# Patient Record
Sex: FEMALE | Race: WHITE | NOT HISPANIC OR LATINO
[De-identification: names, ages, dates, MRNs, and addresses within clinical notes are randomized per-mention and may not be internally consistent; named-entity substitution may affect disease eponyms.]

---

## 2017-01-10 PROBLEM — Z00.00 ENCOUNTER FOR PREVENTIVE HEALTH EXAMINATION: Status: ACTIVE | Noted: 2017-01-10

## 2023-02-02 ENCOUNTER — APPOINTMENT (OUTPATIENT)
Dept: ORTHOPEDIC SURGERY | Facility: CLINIC | Age: 73
End: 2023-02-02
Payer: COMMERCIAL

## 2023-02-02 VITALS — HEIGHT: 67 IN | WEIGHT: 160 LBS | BODY MASS INDEX: 25.11 KG/M2

## 2023-02-02 DIAGNOSIS — Z85.828 PERSONAL HISTORY OF OTHER MALIGNANT NEOPLASM OF SKIN: ICD-10-CM

## 2023-02-02 DIAGNOSIS — Z78.9 OTHER SPECIFIED HEALTH STATUS: ICD-10-CM

## 2023-02-02 DIAGNOSIS — M06.9 RHEUMATOID ARTHRITIS, UNSPECIFIED: ICD-10-CM

## 2023-02-02 DIAGNOSIS — Z85.3 PERSONAL HISTORY OF MALIGNANT NEOPLASM OF BREAST: ICD-10-CM

## 2023-02-02 DIAGNOSIS — Z80.9 FAMILY HISTORY OF MALIGNANT NEOPLASM, UNSPECIFIED: ICD-10-CM

## 2023-02-02 DIAGNOSIS — Z87.39 PERSONAL HISTORY OF OTHER DISEASES OF THE MUSCULOSKELETAL SYSTEM AND CONNECTIVE TISSUE: ICD-10-CM

## 2023-02-02 DIAGNOSIS — Z82.61 FAMILY HISTORY OF ARTHRITIS: ICD-10-CM

## 2023-02-02 PROCEDURE — 73030 X-RAY EXAM OF SHOULDER: CPT | Mod: RT

## 2023-02-02 PROCEDURE — 99203 OFFICE O/P NEW LOW 30 MIN: CPT | Mod: 25

## 2023-02-02 PROCEDURE — 20610 DRAIN/INJ JOINT/BURSA W/O US: CPT | Mod: RT

## 2023-02-02 PROCEDURE — 73110 X-RAY EXAM OF WRIST: CPT | Mod: RT

## 2023-02-02 RX ORDER — CELECOXIB 50 MG/1
CAPSULE ORAL
Refills: 0 | Status: ACTIVE | COMMUNITY

## 2023-02-02 RX ORDER — METHOTREXATE 2.5 MG/1
TABLET ORAL
Refills: 0 | Status: ACTIVE | COMMUNITY

## 2023-02-02 RX ORDER — ADALIMUMAB 40MG/0.4ML
40 KIT SUBCUTANEOUS
Refills: 0 | Status: ACTIVE | COMMUNITY

## 2023-02-02 NOTE — ASSESSMENT
[FreeTextEntry1] : 72-year-old woman with history of rheumatoid arthritis since 1996 with right shoulder and wrist pain.\par Wrist has severe arthritis seen on x-ray.  She is going to be seeing the hand specialist in 2 weeks and she should keep that appointment to discuss treatment options.  She can use a wrist splint if it provides some pain relief and support.  Warm compresses and ice as needed.\par In her shoulder she does not appear to have any significant arthritis or narrowing of the joint space.  She could have inflammation from the rheumatoid arthritis and/or some rotator cuff tendinopathy and bursitis.\par We did a steroid injection today in the subacromial space which hopefully will give her some relief.  She was given home exercises and a prescription for physical therapy.  She takes Celebrex and can take Tylenol as needed.\par Follow-up in about 6 weeks to check on her progress.  If she has ongoing pain I would get an MRI.\par

## 2023-02-02 NOTE — HISTORY OF PRESENT ILLNESS
[de-identified] : 71 y/o RHD woman with long history of rheumatoid arthritis was referred by Dr. Proctor comes in for evaluation for RIGHT shoulder and wrist pain.\par Her right shoulder started hurting in the summer without any specific injury or inciting event.  In the past she has had episodes of pain but they always got better but this time it persisted.  She has limited motion.  It hurts reaching different directions and sleeping.\par Her right wrist is also becoming more painful and she has difficulty lifting objects.\par Getting dressed can be painful.  She has pain when cooking and lifting objects both in the wrist and shoulder.\par Pain radiates down the arm and can be an 8 out of 10 and sharp.  It hurts driving as well. She has pain reaching across body\par Rest helps somewhat.\par She has not tried physical therapy, injections or other treatment.  She does not typically take anything for pain because she takes Celebrex every day anyhow for her rheumatoid arthritis in addition to taking Humira and methotrexate.  Sometimes she will take Tylenol.\par \par She was referred by Dr. Proctor. Santiago has has rheumatoid arthritis since 1996. She regularly takes Celebrex not specifically for shoulder/wrist.

## 2023-02-02 NOTE — REASON FOR VISIT
[Initial Visit] : an initial visit for [Shoulder Pain] : shoulder pain [FreeTextEntry2] : wrist pain

## 2023-02-02 NOTE — PROCEDURE
[Injection] : Injection [Right] : of the right [Subacromial Bursa] : subacromial bursa [Inflammation] : inflammation [Bleeding] : bleeding [Infection] : infection [Allergic Reaction] : allergic reaction [Patient] : patient [Risk] : risk [Benefits] : benefits [Alternatives] : alternatives [Verbal Consent Obtained] : verbal consent was obtained prior to the procedure [Alcohol] : alcohol [Posterior] : posterior [22] : a 22-gauge [1% Lidocaine___(mL)] : [unfilled] mL of 1% Lidocaine [Triamcinolone 40mg/mL___(mL)] : [unfilled] ~UmL of 40mg/mL triamcinolone [Bandage Applied] : a bandage [Tolerated Well] : The patient tolerated the procedure well [None] : none [No Strenuous Activity___day(s)] : avoid strenuous activity for [unfilled] day(s) [PRN] : PRN [FreeTextEntry1] : ChloraPrep

## 2023-02-02 NOTE — PHYSICAL EXAM
[UE] : Sensory: Intact in bilateral upper extremities [Normal RUE] : Right Upper Extremity: No scars, rashes, lesions, ulcers, skin intact [Normal LUE] : Left Upper Extremity: No scars, rashes, lesions, ulcers, skin intact [Normal Touch] : sensation intact for touch [Normal] : Oriented to person, place, and time, insight and judgement were intact and the affect was normal [de-identified] : RIGHT shoulder with left for comparison\par No edema, ecchymoses, erythema, obvious effusion.\par There may be a Damian muscle deformity on the right compared to the left.\par Tender anterior right shoulder and biceps tendon.\par Pain with active and passive right shoulder range of motion.\par Actively she can elevate her arm to 120 degrees and with help to about 170 degrees versus 180 degrees on the left.  Internal rotation to T11.\par With the arms at the side there is about 60 degrees external rotation.\par In 90 degrees abduction there is 70 degrees external rotation and -10 degrees internal rotation.\par Positive Neer and Vaughn.\par Motor is with 4+/5 supraspinatus with pain and 5 -/5 external rotation with pain and 5/5 internal rotation.  5 -/5 biceps with pain.\par Sensation is intact distally.\par \par Right wrist\par Tender radiocarpal joint.  Mild edema and warmth.\par Painful limited range of motion with crepitus\par Prominent ulnar styloid.\par No significant deformity of the fingers/hand otherwise.\par Intact flexion and extension of the digits.  Motor and sensation are intact.  Normal capillary refill [de-identified] : \par \par X-rays taken today of RIGHT shoulder AP, Y lateral and axillary views showed osteopenia.  Minimal cystic change in the humeral head that may be consistent with impingement.  No joint space narrowing.  No narrowing of the joint space\par \par X-rays of the right wrist 3 views today showed severe radiocarpal joint space narrowing as well as erosions of the radiocarpal and distal ulna.  Consistent with rheumatoid arthrosis with severe arthritis

## 2023-02-02 NOTE — CONSULT LETTER
[Dear  ___] : Dear  [unfilled], [Consult Letter:] : I had the pleasure of evaluating your patient, [unfilled]. [Please see my note below.] : Please see my note below. [Consult Closing:] : Thank you very much for allowing me to participate in the care of this patient.  If you have any questions, please do not hesitate to contact me. [Sincerely,] : Sincerely, [FreeTextEntry2] : David Proctor MD [FreeTextEntry3] : Caprice Baker MD

## 2023-02-15 ENCOUNTER — APPOINTMENT (OUTPATIENT)
Dept: ORTHOPEDIC SURGERY | Facility: CLINIC | Age: 73
End: 2023-02-15
Payer: COMMERCIAL

## 2023-02-15 VITALS — BODY MASS INDEX: 25.11 KG/M2 | WEIGHT: 160 LBS | RESPIRATION RATE: 16 BRPM | HEIGHT: 67 IN

## 2023-02-15 DIAGNOSIS — M25.532 PAIN IN LEFT WRIST: ICD-10-CM

## 2023-02-15 PROCEDURE — 99205 OFFICE O/P NEW HI 60 MIN: CPT

## 2023-02-15 PROCEDURE — 73130 X-RAY EXAM OF HAND: CPT | Mod: 50

## 2023-02-19 ENCOUNTER — TRANSCRIPTION ENCOUNTER (OUTPATIENT)
Age: 73
End: 2023-02-19

## 2023-03-16 ENCOUNTER — APPOINTMENT (OUTPATIENT)
Dept: ORTHOPEDIC SURGERY | Facility: CLINIC | Age: 73
End: 2023-03-16
Payer: COMMERCIAL

## 2023-03-16 DIAGNOSIS — G89.29 PAIN IN RIGHT SHOULDER: ICD-10-CM

## 2023-03-16 DIAGNOSIS — M67.911 UNSPECIFIED DISORDER OF SYNOVIUM AND TENDON, RIGHT SHOULDER: ICD-10-CM

## 2023-03-16 DIAGNOSIS — M25.511 PAIN IN RIGHT SHOULDER: ICD-10-CM

## 2023-03-16 PROCEDURE — 99213 OFFICE O/P EST LOW 20 MIN: CPT

## 2023-03-16 NOTE — PHYSICAL EXAM
[UE] : Sensory: Intact in bilateral upper extremities [Normal RUE] : Right Upper Extremity: No scars, rashes, lesions, ulcers, skin intact [Normal LUE] : Left Upper Extremity: No scars, rashes, lesions, ulcers, skin intact [Normal Touch] : sensation intact for touch [Normal] : Oriented to person, place, and time, insight and judgement were intact and the affect was normal [de-identified] : RIGHT shoulder with left for comparison\par No edema, ecchymoses, erythema, obvious effusion.\par Mildly tender anterior shoulder\par Pain with active and passive right shoulder range of motion.\par Actively she can elevate her arm to 165 degrees  versus 180 degrees on the left.  Internal rotation to T9.\par With the arms at the side there is about 60 degrees external rotation.\par In 90 degrees abduction there is 70 degrees external rotation and 10 degrees internal rotation.\par Discomfort with Neer and Vaughn.\par Motor is with 5=/5 supraspinatus with pain and 5 -/5 external rotation with pain and 5/5 internal rotation.  5 -/5 biceps with pain.\par Sensation is intact distally.\par \par Right wrist\par Tender radiocarpal joint.  Mild edema and warmth.\par Painful limited range of motion with crepitus\par Prominent ulnar styloid.\par No significant deformity of the fingers/hand otherwise.\par Intact flexion and extension of the digits.  Motor and sensation are intact.  Normal capillary refill [de-identified] : \par \par X-rays taken 2/2/23 of RIGHT shoulder AP, Y lateral and axillary views showed osteopenia.  Minimal cystic change in the humeral head that may be consistent with impingement.  No joint space narrowing.  No narrowing of the joint space\par \par X-rays of the right wrist 3 views 2/2/2023 showed severe radiocarpal joint space narrowing as well as erosions of the radiocarpal and distal ulna.  Consistent with rheumatoid arthrosis with severe arthritis

## 2023-03-16 NOTE — HISTORY OF PRESENT ILLNESS
[de-identified] : 71 y/o RHD woman with long history of rheumatoid arthritis was referred by Dr. Proctor comes in for evaluation for RIGHT shoulder pain.\par Her right shoulder started hurting in the summer without any specific injury or inciting event.  In the past she has had episodes of pain but they always got better but this time it persisted.  Today she is feeling a lot but better however discomfort is still present. We did an injection that has really helped. Pain is down to a 2/10. She continues to take Celebrex for her rheumatoid arthritis. She has not been taking Tylenol. She was referred for physical therapy but did not start yet, she is doing home exercises that we gave her. She is still being very cautious about her motion.  \par She saw Dr. Hawkins for her wrist pain.  He was also concerned about her left hand with tendon injury\par He spoke to her about a possible fusion.  She is going to see Dr. Ochoa regarding risk replacement as an alternative.  She is not sure she wants anything done.

## 2023-03-16 NOTE — ASSESSMENT
[FreeTextEntry1] : 72-year-old woman with history of rheumatoid arthritis since 1996 presented with right shoulder pain.  A steroid injection alleviated a large amount of the pain and her function is better with better motion and strength.\par She will continue with some home exercises.  If pain starts to get worse she may start some formal physical therapy as well for it.  She takes Celebrex and can take some Tylenol if needed for pain.\par She should be careful with lifting particularly away from her body or above the head.\par If pain gets worse again we may want to evaluate further with an MRI.  We would want to limit the number of steroid injections.\par Follow-up as needed

## 2024-02-28 ENCOUNTER — APPOINTMENT (OUTPATIENT)
Dept: ORTHOPEDIC SURGERY | Facility: CLINIC | Age: 74
End: 2024-02-28
Payer: COMMERCIAL

## 2024-02-28 VITALS — RESPIRATION RATE: 16 BRPM | WEIGHT: 160 LBS | HEIGHT: 67 IN | BODY MASS INDEX: 25.11 KG/M2

## 2024-02-28 DIAGNOSIS — M79.641 PAIN IN RIGHT HAND: ICD-10-CM

## 2024-02-28 PROCEDURE — 73130 X-RAY EXAM OF HAND: CPT | Mod: 50

## 2024-02-28 PROCEDURE — 99204 OFFICE O/P NEW MOD 45 MIN: CPT

## 2024-02-28 PROCEDURE — 99214 OFFICE O/P EST MOD 30 MIN: CPT

## 2024-02-28 NOTE — HISTORY OF PRESENT ILLNESS
[Right] : right hand dominant [FreeTextEntry1] : Patient returns with inability to straighten the right small finger which started yesterday, and a left dorsal hand flareup which started last month.  She denies any specific injury.  Patient has bilateral rheumatoid arthritis wrists with severe radiocarpal and midcarpal arthritis.  Patient was prescribed a Medrol Dosepak by her rheumatologist which helped with the hand flareup.  Patient takes Celebrex daily.

## 2024-02-28 NOTE — PHYSICAL EXAM
[de-identified] : On exam she has an extensor lag now of the right side at the MP joint.  There is distal ulnar swelling and tenderness.  EIP intact.  EDC to ring finger with good strength.  On the left side still has loss of ED Q but EDC appears intact to small finger.  There is now new ulnar deviation of the middle finger with extensor tendon subluxation.  40 degrees of wrist extension and flexion and nontender over radiocarpal or midcarpal joint but tender over distal radial ulnar joint [de-identified] : PA lateral oblique x-rays of both wrist demonstrate severe radiocarpal and midcarpal as well as distal radial ulnar joint arthritis bilaterally.  There is significant caput ulna on the right  Ultrasound demonstrates rupture of the EDC to and possible EDC to little finger

## 2024-02-28 NOTE — ASSESSMENT
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  I recommend that she undergo distal ulnar resection with ECU tenodesis and tendon transfer.  Risk benefits and alternatives discussed including, but not limited to additional tendon ruptures with significant delay to surgery.  We discussed about loss of full extension versus loss of full flexion.  She still will have arthritis in the wrist joint however her symptoms appear to be much more due to her distal radial ulnar joint and the surgery typically resolves this but her radiocarpal and midcarpal joint will be unaffected.  Discussed nerve injury tendon damage etc.  Once again we discussed prompt correction to minimize the time delay and additional tendon ruptures.  With additional tendon ruptures outcome is generally worse

## 2024-03-01 ENCOUNTER — NON-APPOINTMENT (OUTPATIENT)
Age: 74
End: 2024-03-01

## 2024-03-13 ENCOUNTER — TRANSCRIPTION ENCOUNTER (OUTPATIENT)
Age: 74
End: 2024-03-13

## 2024-03-13 RX ORDER — CHLORHEXIDINE GLUCONATE 213 G/1000ML
1 SOLUTION TOPICAL DAILY
Refills: 0 | Status: DISCONTINUED | OUTPATIENT
Start: 2024-03-14 | End: 2024-03-14

## 2024-03-13 RX ORDER — OXYCODONE AND ACETAMINOPHEN 5; 325 MG/1; MG/1
5-325 TABLET ORAL
Qty: 20 | Refills: 0 | Status: ACTIVE | COMMUNITY
Start: 2024-03-13 | End: 1900-01-01

## 2024-03-13 RX ORDER — ONDANSETRON 8 MG/1
8 TABLET, ORALLY DISINTEGRATING ORAL
Qty: 6 | Refills: 0 | Status: ACTIVE | COMMUNITY
Start: 2024-03-13 | End: 1900-01-01

## 2024-03-13 NOTE — ASU PATIENT PROFILE, ADULT - NSICDXPASTMEDICALHX_GEN_ALL_CORE_FT
PAST MEDICAL HISTORY:  Breast cancer     History of poliomyelitis     HLD (hyperlipidemia)     Rheumatoid arthritis

## 2024-03-13 NOTE — ASU PATIENT PROFILE, ADULT - NSICDXPASTSURGICALHX_GEN_ALL_CORE_FT
PAST SURGICAL HISTORY:  History of cholecystectomy     History of foot surgery Bilateral foot fusion    History of lumpectomy of left breast

## 2024-03-14 ENCOUNTER — OUTPATIENT (OUTPATIENT)
Dept: OUTPATIENT SERVICES | Facility: HOSPITAL | Age: 74
LOS: 1 days | Discharge: HOME | End: 2024-03-14
Payer: COMMERCIAL

## 2024-03-14 ENCOUNTER — TRANSCRIPTION ENCOUNTER (OUTPATIENT)
Age: 74
End: 2024-03-14

## 2024-03-14 ENCOUNTER — RESULT REVIEW (OUTPATIENT)
Age: 74
End: 2024-03-14

## 2024-03-14 ENCOUNTER — APPOINTMENT (OUTPATIENT)
Dept: ORTHOPEDIC SURGERY | Facility: AMBULATORY SURGERY CENTER | Age: 74
End: 2024-03-14
Payer: COMMERCIAL

## 2024-03-14 VITALS
HEIGHT: 67 IN | SYSTOLIC BLOOD PRESSURE: 158 MMHG | OXYGEN SATURATION: 97 % | DIASTOLIC BLOOD PRESSURE: 84 MMHG | HEART RATE: 89 BPM | RESPIRATION RATE: 16 BRPM | WEIGHT: 161.82 LBS | TEMPERATURE: 98 F

## 2024-03-14 VITALS
HEART RATE: 69 BPM | DIASTOLIC BLOOD PRESSURE: 58 MMHG | TEMPERATURE: 97 F | SYSTOLIC BLOOD PRESSURE: 117 MMHG | OXYGEN SATURATION: 95 % | RESPIRATION RATE: 16 BRPM

## 2024-03-14 DIAGNOSIS — Z90.49 ACQUIRED ABSENCE OF OTHER SPECIFIED PARTS OF DIGESTIVE TRACT: Chronic | ICD-10-CM

## 2024-03-14 DIAGNOSIS — Z98.890 OTHER SPECIFIED POSTPROCEDURAL STATES: Chronic | ICD-10-CM

## 2024-03-14 PROCEDURE — 88304 TISSUE EXAM BY PATHOLOGIST: CPT | Mod: 26

## 2024-03-14 PROCEDURE — 25337 RECONSTRUCT ULNA/RADIOULNAR: CPT | Mod: RT

## 2024-03-14 RX ORDER — ONDANSETRON 8 MG/1
4 TABLET, FILM COATED ORAL ONCE
Refills: 0 | Status: DISCONTINUED | OUTPATIENT
Start: 2024-03-14 | End: 2024-03-14

## 2024-03-14 RX ORDER — ROSUVASTATIN CALCIUM 5 MG/1
1 TABLET ORAL
Refills: 0 | DISCHARGE

## 2024-03-14 RX ORDER — UPADACITINIB 45 MG/1
1 TABLET, EXTENDED RELEASE ORAL
Refills: 0 | DISCHARGE

## 2024-03-14 RX ORDER — METHOTREXATE 2.5 MG/1
12.5 TABLET ORAL
Refills: 0 | DISCHARGE

## 2024-03-14 RX ORDER — ACETAMINOPHEN 500 MG
650 TABLET ORAL ONCE
Refills: 0 | Status: DISCONTINUED | OUTPATIENT
Start: 2024-03-14 | End: 2024-03-14

## 2024-03-14 RX ORDER — OXYCODONE AND ACETAMINOPHEN 5; 325 MG/1; MG/1
1 TABLET ORAL EVERY 4 HOURS
Refills: 0 | Status: DISCONTINUED | OUTPATIENT
Start: 2024-03-14 | End: 2024-03-14

## 2024-03-14 RX ORDER — CELECOXIB 200 MG/1
1 CAPSULE ORAL
Refills: 0 | DISCHARGE

## 2024-03-14 NOTE — ASU DISCHARGE PLAN (ADULT/PEDIATRIC) - CARE PROVIDER_API CALL
Bar Hawkins  [facility]  [address]  [place] [address]  Phone: [phone]  Fax: [phone]  Follow Up Time:

## 2024-03-14 NOTE — PRE-ANESTHESIA EVALUATION ADULT - NSANTHOSAYNRD_GEN_A_CORE
No. GEOVANNA screening performed.  STOP BANG Legend: 0-2 = LOW Risk; 3-4 = INTERMEDIATE Risk; 5-8 = HIGH Risk

## 2024-03-14 NOTE — ASU DISCHARGE PLAN (ADULT/PEDIATRIC) - ASU DC SPECIAL INSTRUCTIONSFT
Co-Directors: German Burger MD; MD Denton Eden MD   [facility] [place] [facility] [address] 	  [place] [address] 	 	  Phone [phone] (HAND), Fax [phone]    [email or web address]    Hand Surgery Post Operative Instructions      DRESSING CARE:  1.	Please keep bandage ON and DRY until you return to the office for your 1st postoperative visit.   2.	In the shower you must cover bandage with a plastic bag. You can use tape or a rubber band so no water leaks into the bag.   3.	Please do not exercise as that leads to excessive sweating as the bandage will therefore become moist/ wet.    4.	Do not remove or change your bandage. You may apply more tape if dressing starts to unravel.   5.	Please do not insert any objects, such as a pencil, down into the bandage.     ELEVATION:  1.	Keep hand/wrist above heart level at all times or until bandage feels loose. This will help with swelling of the fingers and can be accomplished by using the FOAM PILLOW.   2.	 A sling will not hold your hand/wrist above your heart and therefore its use should be limited (it may also cause shoulder stiffness).     ACTIVITY:  1.	Moving your fingers daily after surgery is very important to prevent stiffness. Please open your fingers completely and close your fingers completely to achieve full range of motion.  **UNLESS TENDON REPAIR OR NERVE REPAIR SURGERY PERFORMED    2.	Move all joints of the extremity that are not immobilized to prevent stiffness (i.e. shoulder, elbow, fingers, and thumb unless instructed otherwise).   3.	Avoid activities which may re-injure your hand or finger.     DIET:   Regular diet. Start light and progress as tolerated.     PAIN MEDICINE:   1.	Pain medicine was sent to your pharmacy.  2.	Take pain medicine on an “AS NEEDED” basis according to your doctor’s instructions.   3.	Your pain will decrease over the next few days allowing you to:   •	Decrease your pain medicine quantity until you stop.   •	Increase the time between doses until you stop.   4.	You should not drink alcoholic beverages while on pain medication.   5.	Take pain medicine with food to prevent nausea.   6.	Constipation can occur. If no bowel movement occurs within 48 hours take a laxative of your choice (over the counter).	 	      CONTACT PHYSICIAN FOR:   Slight pain, swelling and bluish discoloration are to be expected. If you have breathing difficulty or chest pain dial 911 immediately. However, if the following symptoms occur notify your physician:   •	Temperature above 101° F 	        • Inability to urinate in 8 hours   •	Uncontrolled nausea/vomiting   	• Progressively increasing pain   •	Signs of wound infection 	                • Excessive bleeding  (Redness, swelling, pus-like drainage)     • Increasing numbness   •	Excessive swelling and tightness 	• Splint or cast that is too tight      OFFICE APPOINTMENT:    A staff member from the office will call you in the next 1-2 days to schedule your 1st Post Operative appointment to see your physician back in the office. *IF someone does not reach out to you in the next 1-2 days please call the office.      Patient Name _________________________________ Signature ______________________________ Date__________    Witness _____________________________________________________ Date ______________

## 2024-03-15 PROBLEM — C50.919 MALIGNANT NEOPLASM OF UNSPECIFIED SITE OF UNSPECIFIED FEMALE BREAST: Chronic | Status: ACTIVE | Noted: 2024-03-14

## 2024-03-15 PROBLEM — Z86.12 PERSONAL HISTORY OF POLIOMYELITIS: Chronic | Status: ACTIVE | Noted: 2024-03-14

## 2024-03-15 PROBLEM — M06.9 RHEUMATOID ARTHRITIS, UNSPECIFIED: Chronic | Status: ACTIVE | Noted: 2024-03-14

## 2024-03-15 PROBLEM — E78.5 HYPERLIPIDEMIA, UNSPECIFIED: Chronic | Status: ACTIVE | Noted: 2024-03-14

## 2024-03-25 ENCOUNTER — APPOINTMENT (OUTPATIENT)
Dept: ORTHOPEDIC SURGERY | Facility: CLINIC | Age: 74
End: 2024-03-25
Payer: COMMERCIAL

## 2024-03-25 PROCEDURE — 29065 APPL CST SHO TO HAND LNG ARM: CPT | Mod: 58

## 2024-03-25 PROCEDURE — 73110 X-RAY EXAM OF WRIST: CPT | Mod: RT

## 2024-03-25 NOTE — HISTORY OF PRESENT ILLNESS
[2] : the patient reports pain that is 2/10 in severity [Doing Well] : is doing well [No Sign of Infection] : is showing no signs of infection [Excellent Pain Control] : has excellent pain control [Sutures Removed] : sutures were removed [Steri-Strips Removed & Replaced] : steri-strips removed and replaced [de-identified] : DOS: 3/14/24 [de-identified] : 11 days s/p Right distal ulnar resection.  Right wrist ECU tenodesis to stabilize distal radioulnar joint (distal radioulnar joint stabilization procedure). Right wrist EIP to EDQ tendon transfer. [de-identified] : PA lateral oblique x-ray of the right wrist demonstrate status post distal ulnar resection with ECU tenodesis in good alignment [de-identified] : Incision well-healed.  Excellent correction.  Full small finger extension [de-identified] : 11 days s/p Right distal ulnar resection.  Right wrist ECU tenodesis to stabilize distal radioulnar joint (distal radioulnar joint stabilization procedure). Right wrist EIP to EDQ tendon transfer. [de-identified] : Sutures removed.  Benzoin and Steri-Strips placed.  She will go into a long-arm cast in supination including the MP joints and return April 18 for cast removal

## 2024-03-29 ENCOUNTER — APPOINTMENT (OUTPATIENT)
Dept: ORTHOPEDIC SURGERY | Facility: CLINIC | Age: 74
End: 2024-03-29
Payer: COMMERCIAL

## 2024-03-29 PROCEDURE — 29065 APPL CST SHO TO HAND LNG ARM: CPT

## 2024-03-29 PROCEDURE — 99024 POSTOP FOLLOW-UP VISIT: CPT

## 2024-03-29 NOTE — HISTORY OF PRESENT ILLNESS
[2] : the patient reports pain that is 2/10 in severity [Clean/Dry/Intact] : clean, dry and intact [Doing Well] : is doing well [Excellent Pain Control] : has excellent pain control [de-identified] : DOS: 3/14/2024. [de-identified] : Incision healing well, slight edema over the thumb.  No Tinel's over the cubital tunnel.  Sensation intact to small and ring fingers.  First IO 5/5  [de-identified] : 2 weeks 1 day s/p Right distal ulnar resection.  Right wrist ECU tenodesis to stabilize distal radioulnar joint (distal radioulnar joint stabilization procedure). Right wrist EIP to EDQ tendon transfer. [de-identified] : 2 weeks 1 day s/p Right distal ulnar resection.  Right wrist ECU tenodesis to stabilize distal radioulnar joint (distal radioulnar joint stabilization procedure). Right wrist EIP to EDQ tendon transfer. [de-identified] : Patient has some residual swelling.  Patient was placed in a new well-padded and fitting long-arm cast with supination.  She will return as scheduled for follow-up.

## 2024-04-05 ENCOUNTER — NON-APPOINTMENT (OUTPATIENT)
Age: 74
End: 2024-04-05

## 2024-04-05 LAB — SURGICAL PATHOLOGY STUDY: SIGNIFICANT CHANGE UP

## 2024-04-18 ENCOUNTER — APPOINTMENT (OUTPATIENT)
Dept: ORTHOPEDIC SURGERY | Facility: CLINIC | Age: 74
End: 2024-04-18
Payer: COMMERCIAL

## 2024-04-18 DIAGNOSIS — M25.531 PAIN IN RIGHT WRIST: ICD-10-CM

## 2024-04-18 PROCEDURE — 99024 POSTOP FOLLOW-UP VISIT: CPT

## 2024-04-18 PROCEDURE — 73110 X-RAY EXAM OF WRIST: CPT | Mod: RT

## 2024-04-18 NOTE — HISTORY OF PRESENT ILLNESS
[2] : the patient reports pain that is 2/10 in severity [Clean/Dry/Intact] : clean, dry and intact [Doing Well] : is doing well [Excellent Pain Control] : has excellent pain control [No Sign of Infection] : is showing no signs of infection [Chills] : no chills [Constipation] : no constipation [Dysuria] : no dysuria [Fever] : no fever [Erythema] : not erythematous [de-identified] : DOS: 3/14/2024. [de-identified] : 5 weeks s/p s/p Right distal ulnar resection.  Right wrist ECU tenodesis to stabilize distal radioulnar joint (distal radioulnar joint stabilization procedure). Right wrist EIP to EDQ tendon transfer. [de-identified] : Incision well-healed.  Excellent correction of small finger.  No lag [de-identified] : PA lateral oblique x-rays demonstrate status post distal ulnar resection with ECU tenodesis in good alignment [de-identified] : 5 weeks s/p s/p Right distal ulnar resection.  Right wrist ECU tenodesis to stabilize distal radioulnar joint (distal radioulnar joint stabilization procedure). Right wrist EIP to EDQ tendon transfer. [de-identified] : At this point the patient will go into a wrist splint and MP extension splint and begin therapy.  She can begin to discontinue splint completely in 1 week.  She should begin therapy immediately and I will see her back in 5 to 6 weeks

## 2024-05-22 ENCOUNTER — APPOINTMENT (OUTPATIENT)
Dept: ORTHOPEDIC SURGERY | Facility: CLINIC | Age: 74
End: 2024-05-22
Payer: COMMERCIAL

## 2024-05-22 DIAGNOSIS — M19.031 PRIMARY OSTEOARTHRITIS, RIGHT WRIST: ICD-10-CM

## 2024-05-22 PROCEDURE — 73110 X-RAY EXAM OF WRIST: CPT | Mod: RT

## 2024-05-22 PROCEDURE — 99024 POSTOP FOLLOW-UP VISIT: CPT

## 2024-05-22 NOTE — HISTORY OF PRESENT ILLNESS
[de-identified] : DOS: 3/14/2024 [de-identified] : 9 weeks 6 days s/p Right distal ulnar resection.  Right wrist ECU tenodesis to stabilize distal radioulnar joint (distal radioulnar joint stabilization procedure). Right wrist EIP to EDQ tendon transfer. Patient has started OT twice a week. [de-identified] : Tendon transfers intact with full extension.  Has a slight extension contracture index and small finger at MP joint but passively I can flex it.  Good pronation and full supination mild swelling [de-identified] : PA lateral x-ray and oblique of the right wrist demonstrate excellent alignment [de-identified] : 9 weeks 6 days s/p  Right distal ulnar resection.  Right wrist ECU tenodesis to stabilize distal radioulnar joint (distal radioulnar joint stabilization procedure). Right wrist EIP to EDQ tendon transfer.  [de-identified] : Patient doing really well for 9-1/2 weeks after surgery.  Recommend advancing therapy working on aggressive active as well as passive range of motion particularly MP joints little and index finger.  Encouraged working on index finger flexion keeping thumb out of the way.  Strength training must be started and I will see back in 6 weeks

## 2024-06-25 ENCOUNTER — APPOINTMENT (OUTPATIENT)
Dept: ORTHOPEDIC SURGERY | Facility: CLINIC | Age: 74
End: 2024-06-25
Payer: COMMERCIAL

## 2024-06-25 DIAGNOSIS — M06.9 RHEUMATOID ARTHRITIS, UNSPECIFIED: ICD-10-CM

## 2024-06-25 DIAGNOSIS — S66.819A STRAIN OF OTHER SPECIFIED MUSCLES, FASCIA AND TENDONS AT WRIST AND HAND LEVEL, UNSPECIFIED HAND, INITIAL ENCOUNTER: ICD-10-CM

## 2024-06-25 PROCEDURE — 99214 OFFICE O/P EST MOD 30 MIN: CPT

## 2024-06-25 PROCEDURE — 73110 X-RAY EXAM OF WRIST: CPT | Mod: RT

## 2024-08-20 ENCOUNTER — NON-APPOINTMENT (OUTPATIENT)
Age: 74
End: 2024-08-20

## 2024-08-21 ENCOUNTER — APPOINTMENT (OUTPATIENT)
Dept: ORTHOPEDIC SURGERY | Facility: CLINIC | Age: 74
End: 2024-08-21
Payer: COMMERCIAL

## 2024-08-21 DIAGNOSIS — M67.912 UNSPECIFIED DISORDER OF SYNOVIUM AND TENDON, LEFT SHOULDER: ICD-10-CM

## 2024-08-21 DIAGNOSIS — M67.911 UNSPECIFIED DISORDER OF SYNOVIUM AND TENDON, RIGHT SHOULDER: ICD-10-CM

## 2024-08-21 PROCEDURE — 73030 X-RAY EXAM OF SHOULDER: CPT | Mod: LT

## 2024-08-21 PROCEDURE — 99213 OFFICE O/P EST LOW 20 MIN: CPT

## 2024-08-21 RX ORDER — ADALIMUMAB-FKJP 40MG/0.8ML
KIT SUBCUTANEOUS
Refills: 0 | Status: ACTIVE | COMMUNITY

## 2024-08-21 NOTE — PHYSICAL EXAM
[UE] : Sensory: Intact in bilateral upper extremities [Normal RUE] : Right Upper Extremity: No scars, rashes, lesions, ulcers, skin intact [Normal LUE] : Left Upper Extremity: No scars, rashes, lesions, ulcers, skin intact [Normal Touch] : sensation intact for touch [Normal] : Oriented to person, place, and time, insight and judgement were intact and the affect was normal [de-identified] : RIGHT shoulder with left for comparison\par  No edema, ecchymoses, erythema, obvious effusion.\par  Mildly tender anterior shoulder\par  Pain with active and passive right shoulder range of motion.\par  Actively she can elevate her arm to 165 degrees  versus 180 degrees on the left.  Internal rotation to T9.\par  With the arms at the side there is about 60 degrees external rotation.\par  In 90 degrees abduction there is 70 degrees external rotation and 10 degrees internal rotation.\par  Discomfort with Neer and Vaughn.\par  Motor is with 5=/5 supraspinatus with pain and 5 -/5 external rotation with pain and 5/5 internal rotation.  5 -/5 biceps with pain.\par  Sensation is intact distally.\par  \par  Right wrist\par  Tender radiocarpal joint.  Mild edema and warmth.\par  Painful limited range of motion with crepitus\par  Prominent ulnar styloid.\par  No significant deformity of the fingers/hand otherwise.\par  Intact flexion and extension of the digits.  Motor and sensation are intact.  Normal capillary refill [de-identified] :  X-rays taken today to evaluate pain of the left shoulder  AP, Y lateral and axillary views showed osteopenia.  Minimal joint space narrowing that may be c/w mild chondral wear.  No osteophytes. No elevation humeral head

## 2024-08-21 NOTE — CONSULT LETTER
[Dear  ___] : Dear  [unfilled], [Courtesy Letter:] : I had the pleasure of seeing your patient, [unfilled], in my office today. [Please see my note below.] : Please see my note below. [Consult Closing:] : Thank you very much for allowing me to participate in the care of this patient.  If you have any questions, please do not hesitate to contact me. [Sincerely,] : Sincerely, [FreeTextEntry2] : David Proctor MD [FreeTextEntry3] : Caprice Baker MD

## 2024-08-21 NOTE — HISTORY OF PRESENT ILLNESS
[de-identified] : 72 y/o RHD woman with long history of rheumatoid arthritis was referred by Dr. Proctor comes in for new issue with her left shoulder. Steroid injection right shoulder alleviated pain and right has been fine. She had surgery right hand this year / tendon transfers so she has been favoring the right and using the left more which may have aggravated the left shoulder.  She has pain since June that is intermittent and worse at night and better during the day. Certain activities or movements hurt more. She takes Celebrex daily and Hulio injection once a wk for RA. She takes MTX intermittently but it causes hair loss. She hasn't taken Tylenol. No shoulder treatment.  Her left shoulder

## 2024-08-21 NOTE — HISTORY OF PRESENT ILLNESS
[de-identified] : 74 y/o RHD woman with long history of rheumatoid arthritis was referred by Dr. Proctor comes in for new issue with her left shoulder. Steroid injection right shoulder alleviated pain and right has been fine. She had surgery right hand this year / tendon transfers so she has been favoring the right and using the left more which may have aggravated the left shoulder.  She has pain since June that is intermittent and worse at night and better during the day. Certain activities or movements hurt more. She takes Celebrex daily and Hulio injection once a wk for RA. She takes MTX intermittently but it causes hair loss. She hasn't taken Tylenol. No shoulder treatment.  Her left shoulder

## 2024-08-21 NOTE — ASSESSMENT
[FreeTextEntry1] : 73-year-old woman with history of rheumatoid arthritis since 1996 presented with left shoulder pain.   She has a painful arc but relatively good strength. There is likely tendinopathy and some inflammation in the bursa and or joint. She can try PT and home exercises first. Tylenol at night for pain. Topical Lidocaine or other pain patches. If it isn't improving then I would do a steroid injection. She is already on NSAID Celebrex. F/U 5-6 wks if not better and we will try an injection. If pain is ongoing we may also get MRI

## 2024-08-21 NOTE — PHYSICAL EXAM
[UE] : Sensory: Intact in bilateral upper extremities [Normal RUE] : Right Upper Extremity: No scars, rashes, lesions, ulcers, skin intact [Normal LUE] : Left Upper Extremity: No scars, rashes, lesions, ulcers, skin intact [Normal Touch] : sensation intact for touch [Normal] : Oriented to person, place, and time, insight and judgement were intact and the affect was normal [de-identified] : RIGHT shoulder with left for comparison\par  No edema, ecchymoses, erythema, obvious effusion.\par  Mildly tender anterior shoulder\par  Pain with active and passive right shoulder range of motion.\par  Actively she can elevate her arm to 165 degrees  versus 180 degrees on the left.  Internal rotation to T9.\par  With the arms at the side there is about 60 degrees external rotation.\par  In 90 degrees abduction there is 70 degrees external rotation and 10 degrees internal rotation.\par  Discomfort with Neer and Vaughn.\par  Motor is with 5=/5 supraspinatus with pain and 5 -/5 external rotation with pain and 5/5 internal rotation.  5 -/5 biceps with pain.\par  Sensation is intact distally.\par  \par  Right wrist\par  Tender radiocarpal joint.  Mild edema and warmth.\par  Painful limited range of motion with crepitus\par  Prominent ulnar styloid.\par  No significant deformity of the fingers/hand otherwise.\par  Intact flexion and extension of the digits.  Motor and sensation are intact.  Normal capillary refill [de-identified] :  X-rays taken today to evaluate pain of the left shoulder  AP, Y lateral and axillary views showed osteopenia.  Minimal joint space narrowing that may be c/w mild chondral wear.  No osteophytes. No elevation humeral head

## 2024-09-30 VITALS — WEIGHT: 160 LBS | HEIGHT: 67 IN | BODY MASS INDEX: 25.11 KG/M2 | RESPIRATION RATE: 16 BRPM

## 2024-10-01 ENCOUNTER — APPOINTMENT (OUTPATIENT)
Dept: ORTHOPEDIC SURGERY | Facility: CLINIC | Age: 74
End: 2024-10-01
Payer: COMMERCIAL

## 2024-10-01 DIAGNOSIS — S66.819A STRAIN OF OTHER SPECIFIED MUSCLES, FASCIA AND TENDONS AT WRIST AND HAND LEVEL, UNSPECIFIED HAND, INITIAL ENCOUNTER: ICD-10-CM

## 2024-10-01 PROCEDURE — 99214 OFFICE O/P EST MOD 30 MIN: CPT

## 2024-10-01 PROCEDURE — 73110 X-RAY EXAM OF WRIST: CPT | Mod: RT

## 2024-10-01 NOTE — PHYSICAL EXAM
[de-identified] : Incision well-healed full pronation full supination.  Distal ulna stable.  No extensor lag.  Can flex 85 degrees at small finger.  Left hand has no EDQ.  Nontender over distal radial ulnar joint.  EDC to ring finger intact [de-identified] : PA lateral oblique x-rays demonstrate status post distal ulnar resection in good alignment

## 2024-10-01 NOTE — ASSESSMENT
[FreeTextEntry1] : She is doing well with regard to's von Keon reconstruction on the right.  I anticipate continued improvement.  On the left I counseled her on considering left distal ulnar resection with ECU tenodesis before she ruptures EDC to ring finger.  She does not want to do this right now and is going to monitor.  When and if the small finger or ring finger droop she will immediately let me know so as to address this in a timely fashion.  She understands that surgery and recovery is longer with a tendon transfer than just the ECU tenodesis and distal ulnar resection.

## 2024-10-01 NOTE — PHYSICAL EXAM
[de-identified] : Incision well-healed full pronation full supination.  Distal ulna stable.  No extensor lag.  Can flex 85 degrees at small finger.  Left hand has no EDQ.  Nontender over distal radial ulnar joint.  EDC to ring finger intact [de-identified] : PA lateral oblique x-rays demonstrate status post distal ulnar resection in good alignment

## 2024-10-01 NOTE — HISTORY OF PRESENT ILLNESS
[Right] : right hand dominant [FreeTextEntry1] : DOS: 3/14/2024 6 months status post right distal ulna resection. Right wrist ECU tenodesis to stabilize distal radial ulnar joint (distal radial ulnar joint stabilization procedure). Right wrist EIP to EDQ tendon transfer. Patient states that she just finished therapy.

## 2024-10-02 ENCOUNTER — APPOINTMENT (OUTPATIENT)
Dept: ORTHOPEDIC SURGERY | Facility: CLINIC | Age: 74
End: 2024-10-02
Payer: COMMERCIAL

## 2024-10-02 DIAGNOSIS — M25.512 PAIN IN LEFT SHOULDER: ICD-10-CM

## 2024-10-02 DIAGNOSIS — M67.912 UNSPECIFIED DISORDER OF SYNOVIUM AND TENDON, LEFT SHOULDER: ICD-10-CM

## 2024-10-02 DIAGNOSIS — M19.012 PRIMARY OSTEOARTHRITIS, LEFT SHOULDER: ICD-10-CM

## 2024-10-02 DIAGNOSIS — G89.29 PAIN IN LEFT SHOULDER: ICD-10-CM

## 2024-10-02 PROCEDURE — 20610 DRAIN/INJ JOINT/BURSA W/O US: CPT | Mod: 59,LT

## 2024-10-02 PROCEDURE — 99213 OFFICE O/P EST LOW 20 MIN: CPT | Mod: 25

## 2024-10-02 NOTE — HISTORY OF PRESENT ILLNESS
[de-identified] : 74 y/o RHD woman with long history of rheumatoid arthritis was referred by Dr. Proctor comes in for follow up for left shoulder.  She was not able to start PT or OT for her shoulder as she has been doing OT for her wrist and they could not do both. She has tried some exercises on her own. She continues to have pain, and it feels about the same as last visit. She takes Celebrex daily for her rheumatoid arthritis.  She has pain reaching her arm.  Sometimes she sleeps with her arm overhead at night which aggravates it and then it is hard to bring it down in the morning. She saw Dr. Proctor last week and he recommended a possible steroid injection.

## 2024-10-02 NOTE — PHYSICAL EXAM
[UE] : Sensory: Intact in bilateral upper extremities [Normal RUE] : Right Upper Extremity: No scars, rashes, lesions, ulcers, skin intact [Normal LUE] : Left Upper Extremity: No scars, rashes, lesions, ulcers, skin intact [Normal Touch] : sensation intact for touch [Normal] : Oriented to person, place, and time, insight and judgement were intact and the affect was normal [de-identified] : Left shoulder  No edema, ecchymoses, erythema, obvious effusion. +tender anterior shoulder Pain with active and passive right shoulder range of motion. Actively she can elevate her arm to 165 degrees  versus 180 degrees on the right.  Internal rotation to T11. With the arms at the side there is about 50 degrees external rotation also decreased compared to the right In 90 degrees abduction there is 70 degrees external rotation and -10 degrees internal rotation. Pain with Neer and ++ Vaughn. Motor is with 5=/5 supraspinatus with pain and 5 -/5 external rotation with pain and 5/5 internal rotation.  5 -/5 biceps with pain. Sensation is intact distally.  [de-identified] :  X-rays 8/21/24 to evaluate pain of the left shoulder  AP, Y lateral and axillary views showed osteopenia.  Mild joint space narrowing that may be c/w mild chondral wear.  No osteophytes. No elevation humeral head

## 2024-10-02 NOTE — ASSESSMENT
[FreeTextEntry1] : 73-year-old woman with history of rheumatoid arthritis since 1996 with left shoulder pain ongoing with some mild decreased range of motion in every direction around the shoulder.  There is likely mild arthritis in the shoulder joint and I suspect some bursitis and rotator cuff tendinopathy.  We did a steroid injection today to try to quiet down the pain and inflammation and then she will go for formal physical therapy.  Continue to work on exercises on her own without aggravating the shoulder.  Heat and ice as needed.  She is on Celebrex. Follow-up in 6 to 8 weeks if it is not better.  If that is the case and may want her to get an MRI and workup further.

## 2024-10-02 NOTE — PROCEDURE
[Injection] : Injection [Left] : of the left [Inflammation] : inflammation [Bleeding] : bleeding [Infection] : infection [Allergic Reaction] : allergic reaction [Patient] : patient [Risk] : risk [Benefits] : benefits [Alternatives] : alternatives [Verbal Consent Obtained] : verbal consent was obtained prior to the procedure [Alcohol] : alcohol [Ethyl Chloride Spray] : ethyl chloride spray was used as a topical anesthetic [Posterior] : posterior [1% Lidocaine___(mL)] : [unfilled] mL of 1% Lidocaine [Triamcinolone 40mg/mL___(mL)] : [unfilled] ~UmL of 40mg/mL triamcinolone [Bandage Applied] : a bandage [Tolerated Well] : The patient tolerated the procedure well [None] : none [No Strenuous Activity___day(s)] : avoid strenuous activity for [unfilled] day(s) [Subacromial Bursa] : subacromial bursa [22] : a 22-gauge [FreeTextEntry1] : chloraprep

## 2024-10-02 NOTE — CONSULT LETTER
[Dear  ___] : Dear  [unfilled], [Courtesy Letter:] : I had the pleasure of seeing your patient, [unfilled], in my office today. [Please see my note below.] : Please see my note below. [Consult Closing:] : Thank you very much for allowing me to participate in the care of this patient.  If you have any questions, please do not hesitate to contact me. [Sincerely,] : Sincerely, [Consult Letter:] : I had the pleasure of evaluating your patient, [unfilled]. [FreeTextEntry2] : David Proctor MD [FreeTextEntry3] : Caprice Baker MD

## 2024-10-02 NOTE — HISTORY OF PRESENT ILLNESS
[de-identified] : 72 y/o RHD woman with long history of rheumatoid arthritis was referred by Dr. Proctor comes in for follow up for left shoulder.  She was not able to start PT or OT for her shoulder as she has been doing OT for her wrist and they could not do both. She has tried some exercises on her own. She continues to have pain, and it feels about the same as last visit. She takes Celebrex daily for her rheumatoid arthritis.  She has pain reaching her arm.  Sometimes she sleeps with her arm overhead at night which aggravates it and then it is hard to bring it down in the morning. She saw Dr. Proctor last week and he recommended a possible steroid injection.

## 2024-11-13 ENCOUNTER — APPOINTMENT (OUTPATIENT)
Dept: ORTHOPEDIC SURGERY | Facility: CLINIC | Age: 74
End: 2024-11-13
Payer: COMMERCIAL

## 2024-11-13 DIAGNOSIS — M19.012 PRIMARY OSTEOARTHRITIS, LEFT SHOULDER: ICD-10-CM

## 2024-11-13 DIAGNOSIS — M67.912 UNSPECIFIED DISORDER OF SYNOVIUM AND TENDON, LEFT SHOULDER: ICD-10-CM

## 2024-11-13 PROCEDURE — 99213 OFFICE O/P EST LOW 20 MIN: CPT

## 2024-12-09 ENCOUNTER — RESULT REVIEW (OUTPATIENT)
Age: 74
End: 2024-12-09

## 2024-12-09 ENCOUNTER — OUTPATIENT (OUTPATIENT)
Dept: OUTPATIENT SERVICES | Facility: HOSPITAL | Age: 74
LOS: 1 days | End: 2024-12-09
Payer: COMMERCIAL

## 2024-12-09 ENCOUNTER — APPOINTMENT (OUTPATIENT)
Dept: MRI IMAGING | Facility: CLINIC | Age: 74
End: 2024-12-09

## 2024-12-09 DIAGNOSIS — M67.912 UNSPECIFIED DISORDER OF SYNOVIUM AND TENDON, LEFT SHOULDER: ICD-10-CM

## 2024-12-09 DIAGNOSIS — M25.512 PAIN IN LEFT SHOULDER: ICD-10-CM

## 2024-12-09 DIAGNOSIS — Z98.890 OTHER SPECIFIED POSTPROCEDURAL STATES: Chronic | ICD-10-CM

## 2024-12-09 DIAGNOSIS — Z90.49 ACQUIRED ABSENCE OF OTHER SPECIFIED PARTS OF DIGESTIVE TRACT: Chronic | ICD-10-CM

## 2024-12-09 DIAGNOSIS — M19.012 PRIMARY OSTEOARTHRITIS, LEFT SHOULDER: ICD-10-CM

## 2024-12-09 PROCEDURE — 73221 MRI JOINT UPR EXTREM W/O DYE: CPT | Mod: 26,LT

## 2024-12-09 PROCEDURE — 73221 MRI JOINT UPR EXTREM W/O DYE: CPT

## 2024-12-13 ENCOUNTER — APPOINTMENT (OUTPATIENT)
Dept: ORTHOPEDIC SURGERY | Facility: CLINIC | Age: 74
End: 2024-12-13

## 2024-12-16 ENCOUNTER — APPOINTMENT (OUTPATIENT)
Dept: ORTHOPEDIC SURGERY | Facility: CLINIC | Age: 74
End: 2024-12-16
Payer: COMMERCIAL

## 2024-12-16 DIAGNOSIS — M05.712: ICD-10-CM

## 2024-12-16 DIAGNOSIS — M19.012 PRIMARY OSTEOARTHRITIS, LEFT SHOULDER: ICD-10-CM

## 2024-12-16 DIAGNOSIS — M75.122 COMPLETE ROTATOR CUFF TEAR OR RUPTURE OF LEFT SHOULDER, NOT SPECIFIED AS TRAUMATIC: ICD-10-CM

## 2024-12-16 DIAGNOSIS — M65.912 UNSPECIFIED SYNOVITIS AND TENOSYNOVITIS, LEFT SHOULDER: ICD-10-CM

## 2024-12-16 DIAGNOSIS — M67.912 UNSPECIFIED DISORDER OF SYNOVIUM AND TENDON, LEFT SHOULDER: ICD-10-CM

## 2024-12-16 PROCEDURE — 20610 DRAIN/INJ JOINT/BURSA W/O US: CPT | Mod: 59,LT

## 2024-12-16 PROCEDURE — 99214 OFFICE O/P EST MOD 30 MIN: CPT | Mod: 25

## 2025-04-24 ENCOUNTER — APPOINTMENT (OUTPATIENT)
Dept: ORTHOPEDIC SURGERY | Facility: CLINIC | Age: 75
End: 2025-04-24
Payer: COMMERCIAL

## 2025-04-24 DIAGNOSIS — M65.912 UNSPECIFIED SYNOVITIS AND TENOSYNOVITIS, LEFT SHOULDER: ICD-10-CM

## 2025-04-24 DIAGNOSIS — M67.912 UNSPECIFIED DISORDER OF SYNOVIUM AND TENDON, LEFT SHOULDER: ICD-10-CM

## 2025-04-24 DIAGNOSIS — M75.122 COMPLETE ROTATOR CUFF TEAR OR RUPTURE OF LEFT SHOULDER, NOT SPECIFIED AS TRAUMATIC: ICD-10-CM

## 2025-04-24 DIAGNOSIS — M05.712: ICD-10-CM

## 2025-04-24 PROCEDURE — 73030 X-RAY EXAM OF SHOULDER: CPT | Mod: LT

## 2025-04-24 PROCEDURE — 99213 OFFICE O/P EST LOW 20 MIN: CPT | Mod: 25

## 2025-04-24 PROCEDURE — 20610 DRAIN/INJ JOINT/BURSA W/O US: CPT | Mod: 59,LT

## 2025-07-09 ENCOUNTER — NON-APPOINTMENT (OUTPATIENT)
Age: 75
End: 2025-07-09

## 2025-07-09 ENCOUNTER — APPOINTMENT (OUTPATIENT)
Age: 75
End: 2025-07-09
Payer: COMMERCIAL

## 2025-07-09 PROCEDURE — 92015 DETERMINE REFRACTIVE STATE: CPT

## 2025-07-09 PROCEDURE — 92014 COMPRE OPH EXAM EST PT 1/>: CPT

## 2025-07-09 PROCEDURE — 92134 CPTRZ OPH DX IMG PST SGM RTA: CPT

## 2025-07-09 PROCEDURE — 92201 OPSCPY EXTND RTA DRAW UNI/BI: CPT

## 2025-09-05 ENCOUNTER — APPOINTMENT (OUTPATIENT)
Dept: ORTHOPEDIC SURGERY | Facility: CLINIC | Age: 75
End: 2025-09-05

## (undated) DEVICE — SAW BLADE STRYKER PRECISION THIN TYPE 9 X 0.38 X 18.5MM

## (undated) DEVICE — Device

## (undated) DEVICE — GLV 8 PROTEXIS (WHITE)

## (undated) DEVICE — MARKING PEN W RULER

## (undated) DEVICE — WARMING BLANKET LOWER ADULT

## (undated) DEVICE — SUT SILK 4-0 18" PS-2

## (undated) DEVICE — BUR SIDE CUTTING, TAPERED MED

## (undated) DEVICE — GLV 8.5 PROTEXIS (WHITE)

## (undated) DEVICE — VENODYNE/SCD SLEEVE CALF MEDIUM

## (undated) DEVICE — TOURNIQUET CUFF 18" DUAL PORT SINGLE BLADDER W PLC  (BLACK)

## (undated) DEVICE — PACK HAND

## (undated) DEVICE — SUT ETHILON 5-0 18" P-3

## (undated) DEVICE — SUT VICRYL 4-0 18" P-3 UNDYED

## (undated) DEVICE — SLV COMPRESSION KNEE MED